# Patient Record
Sex: FEMALE | Race: WHITE | NOT HISPANIC OR LATINO | Employment: OTHER | URBAN - METROPOLITAN AREA
[De-identification: names, ages, dates, MRNs, and addresses within clinical notes are randomized per-mention and may not be internally consistent; named-entity substitution may affect disease eponyms.]

---

## 2023-10-13 PROBLEM — J32.9 CHRONIC RHINOSINUSITIS: Status: ACTIVE | Noted: 2023-10-13

## 2023-10-13 PROBLEM — R44.8 FACIAL PRESSURE: Status: ACTIVE | Noted: 2023-10-13

## 2023-10-13 PROBLEM — J34.3 HYPERTROPHY OF BOTH INFERIOR NASAL TURBINATES: Status: ACTIVE | Noted: 2023-10-13

## 2023-10-13 PROBLEM — J34.89 NASAL OBSTRUCTION: Status: ACTIVE | Noted: 2023-10-13

## 2023-10-13 PROBLEM — J34.2 NASAL SEPTAL DEVIATION: Status: ACTIVE | Noted: 2023-10-13

## 2023-10-13 RX ORDER — LEVALBUTEROL TARTRATE 45 UG/1
2 AEROSOL, METERED ORAL EVERY 4 HOURS PRN
COMMUNITY
End: 2023-10-16 | Stop reason: SDUPTHER

## 2023-10-13 RX ORDER — ESOMEPRAZOLE MAGNESIUM 40 MG/1
40 CAPSULE, DELAYED RELEASE ORAL
COMMUNITY

## 2023-10-13 RX ORDER — LEVOFLOXACIN 500 MG/1
500 TABLET, FILM COATED ORAL DAILY
COMMUNITY
End: 2023-10-16 | Stop reason: ALTCHOICE

## 2023-10-13 RX ORDER — AZELASTINE 1 MG/ML
1 SPRAY, METERED NASAL 2 TIMES DAILY
COMMUNITY

## 2023-10-13 RX ORDER — OXYBUTYNIN CHLORIDE 10 MG/1
10 TABLET, EXTENDED RELEASE ORAL
COMMUNITY

## 2023-10-13 RX ORDER — CETIRIZINE HYDROCHLORIDE 1 MG/ML
SOLUTION ORAL DAILY
COMMUNITY

## 2023-10-13 RX ORDER — DULOXETIN HYDROCHLORIDE 20 MG/1
20 CAPSULE, DELAYED RELEASE ORAL DAILY
COMMUNITY
End: 2023-10-16 | Stop reason: ALTCHOICE

## 2023-10-13 RX ORDER — FERROUS SULFATE, DRIED 160(50) MG
1 TABLET, EXTENDED RELEASE ORAL DAILY
COMMUNITY

## 2023-10-13 RX ORDER — LEVALBUTEROL TARTRATE 45 UG/1
1 AEROSOL, METERED ORAL EVERY 4 HOURS PRN
COMMUNITY

## 2023-10-13 RX ORDER — BUPROPION HYDROCHLORIDE 150 MG/1
150 TABLET, FILM COATED, EXTENDED RELEASE ORAL
COMMUNITY
End: 2023-10-16 | Stop reason: ALTCHOICE

## 2023-10-13 RX ORDER — HYDROXYZINE HYDROCHLORIDE 10 MG/5ML
20 SYRUP ORAL NIGHTLY
COMMUNITY

## 2023-10-16 ENCOUNTER — OFFICE VISIT (OUTPATIENT)
Dept: OTOLARYNGOLOGY | Facility: CLINIC | Age: 38
End: 2023-10-16
Payer: MEDICARE

## 2023-10-16 VITALS
BODY MASS INDEX: 25.76 KG/M2 | HEIGHT: 62 IN | HEART RATE: 79 BPM | TEMPERATURE: 97.8 F | WEIGHT: 140 LBS | SYSTOLIC BLOOD PRESSURE: 93 MMHG | RESPIRATION RATE: 16 BRPM | DIASTOLIC BLOOD PRESSURE: 61 MMHG

## 2023-10-16 DIAGNOSIS — R09.81 NASAL CONGESTION: ICD-10-CM

## 2023-10-16 DIAGNOSIS — J32.9 CHRONIC RHINOSINUSITIS: Primary | ICD-10-CM

## 2023-10-16 DIAGNOSIS — J34.89 NASAL DRAINAGE: ICD-10-CM

## 2023-10-16 DIAGNOSIS — J34.3 HYPERTROPHY OF BOTH INFERIOR NASAL TURBINATES: ICD-10-CM

## 2023-10-16 DIAGNOSIS — J34.2 NASAL SEPTAL DEVIATION: ICD-10-CM

## 2023-10-16 DIAGNOSIS — J34.89 NASAL OBSTRUCTION: ICD-10-CM

## 2023-10-16 DIAGNOSIS — R44.8 FACIAL PRESSURE: ICD-10-CM

## 2023-10-16 PROBLEM — R09.89 THROAT TIGHTNESS: Status: RESOLVED | Noted: 2019-05-17 | Resolved: 2023-10-16

## 2023-10-16 PROBLEM — R21 RASH: Status: RESOLVED | Noted: 2018-05-16 | Resolved: 2023-10-16

## 2023-10-16 PROBLEM — M79.2 NEUROPATHIC PAIN: Status: RESOLVED | Noted: 2019-04-30 | Resolved: 2023-10-16

## 2023-10-16 PROBLEM — Z91.018 ALLERGY TO FOOD: Status: RESOLVED | Noted: 2018-05-16 | Resolved: 2023-10-16

## 2023-10-16 PROBLEM — J45.20 MILD INTERMITTENT ASTHMA WITHOUT COMPLICATION (HHS-HCC): Status: RESOLVED | Noted: 2023-04-14 | Resolved: 2023-10-16

## 2023-10-16 PROBLEM — E03.9 HYPOTHYROIDISM: Status: RESOLVED | Noted: 2019-04-30 | Resolved: 2023-10-16

## 2023-10-16 PROBLEM — J45.909 ASTHMA (HHS-HCC): Status: RESOLVED | Noted: 2019-04-30 | Resolved: 2023-10-16

## 2023-10-16 PROBLEM — N30.90 RECURRENT CYSTITIS: Status: RESOLVED | Noted: 2022-12-13 | Resolved: 2023-10-16

## 2023-10-16 PROBLEM — G90.A POSTURAL ORTHOSTATIC TACHYCARDIA SYNDROME: Status: RESOLVED | Noted: 2019-04-30 | Resolved: 2023-10-16

## 2023-10-16 PROBLEM — T78.3XXA ANGIO-EDEMA: Status: RESOLVED | Noted: 2019-05-17 | Resolved: 2023-10-16

## 2023-10-16 PROBLEM — R55 NEUROGENIC SYNCOPE: Status: RESOLVED | Noted: 2019-04-13 | Resolved: 2023-10-16

## 2023-10-16 PROBLEM — I73.00 RAYNAUD'S PHENOMENON: Status: RESOLVED | Noted: 2019-04-30 | Resolved: 2023-10-16

## 2023-10-16 PROBLEM — H17.9 CORNEAL SCAR, LEFT EYE: Status: RESOLVED | Noted: 2017-07-10 | Resolved: 2023-10-16

## 2023-10-16 PROBLEM — R35.0 URINARY FREQUENCY: Status: RESOLVED | Noted: 2022-12-13 | Resolved: 2023-10-16

## 2023-10-16 PROBLEM — R00.1 SINUS BRADYCARDIA: Status: RESOLVED | Noted: 2019-04-30 | Resolved: 2023-10-16

## 2023-10-16 PROBLEM — H52.7 REFRACTIVE ERROR: Status: RESOLVED | Noted: 2017-07-10 | Resolved: 2023-10-16

## 2023-10-16 PROBLEM — R22.0 LIP SWELLING: Status: RESOLVED | Noted: 2018-05-16 | Resolved: 2023-10-16

## 2023-10-16 PROBLEM — R53.83 FATIGUE: Status: RESOLVED | Noted: 2019-04-13 | Resolved: 2023-10-16

## 2023-10-16 PROBLEM — L50.1 IDIOPATHIC URTICARIA: Status: RESOLVED | Noted: 2019-05-17 | Resolved: 2023-10-16

## 2023-10-16 PROCEDURE — 1036F TOBACCO NON-USER: CPT | Performed by: STUDENT IN AN ORGANIZED HEALTH CARE EDUCATION/TRAINING PROGRAM

## 2023-10-16 PROCEDURE — 99214 OFFICE O/P EST MOD 30 MIN: CPT | Performed by: STUDENT IN AN ORGANIZED HEALTH CARE EDUCATION/TRAINING PROGRAM

## 2023-10-16 RX ORDER — OMEPRAZOLE 20 MG/1
1 CAPSULE, DELAYED RELEASE ORAL DAILY
COMMUNITY
Start: 2017-07-06 | End: 2023-10-16 | Stop reason: ALTCHOICE

## 2023-10-16 RX ORDER — GUAIFENESIN AND PHENYLEPHRINE HCL 400; 10 MG/1; MG/1
1 TABLET ORAL
COMMUNITY

## 2023-10-16 RX ORDER — MOMETASONE FUROATE 50 UG/1
2 SPRAY, METERED NASAL
COMMUNITY
Start: 2022-10-17

## 2023-10-16 RX ORDER — FLUTICASONE PROPIONATE 50 MCG
2 SPRAY, SUSPENSION (ML) NASAL DAILY
COMMUNITY
Start: 2023-05-08

## 2023-10-16 RX ORDER — ONDANSETRON 4 MG/1
1 TABLET, ORALLY DISINTEGRATING ORAL EVERY 8 HOURS PRN
COMMUNITY
Start: 2017-07-06

## 2023-10-16 RX ORDER — PREDNISONE 10 MG/1
TABLET ORAL
Qty: 34 TABLET | Refills: 0 | Status: SHIPPED | OUTPATIENT
Start: 2023-10-16 | End: 2023-11-10 | Stop reason: ALTCHOICE

## 2023-10-16 RX ORDER — EPINEPHRINE 0.3 MG/.3ML
1 INJECTION INTRAMUSCULAR ONCE AS NEEDED
COMMUNITY

## 2023-10-16 RX ORDER — ALBUTEROL SULFATE 90 UG/1
2 AEROSOL, METERED RESPIRATORY (INHALATION) EVERY 4 HOURS PRN
COMMUNITY
Start: 2019-04-12

## 2023-10-16 RX ORDER — LEVONORGESTREL 52 MG/1
1 INTRAUTERINE DEVICE INTRAUTERINE ONCE
COMMUNITY

## 2023-10-16 RX ORDER — LEVOTHYROXINE SODIUM 75 UG/1
75 TABLET ORAL DAILY
COMMUNITY

## 2023-10-16 RX ORDER — FLUTICASONE PROPIONATE 44 UG/1
1 AEROSOL, METERED RESPIRATORY (INHALATION)
COMMUNITY
Start: 2022-10-17

## 2023-10-16 RX ORDER — ACETAMINOPHEN 500 MG
50 TABLET ORAL DAILY
COMMUNITY
Start: 2023-06-01

## 2023-10-16 RX ORDER — CEFDINIR 300 MG/1
300 CAPSULE ORAL DAILY
COMMUNITY
Start: 2023-03-25 | End: 2023-10-16 | Stop reason: ALTCHOICE

## 2023-10-16 RX ORDER — MIDODRINE HYDROCHLORIDE 10 MG/1
10 TABLET ORAL 3 TIMES DAILY
COMMUNITY

## 2023-10-16 RX ORDER — CARIPRAZINE 1.5 MG/1
1.5 CAPSULE, GELATIN COATED ORAL EVERY 24 HOURS
COMMUNITY
Start: 2023-08-17

## 2023-10-16 RX ORDER — FAMOTIDINE 20 MG/1
20 TABLET, FILM COATED ORAL 2 TIMES DAILY
COMMUNITY
Start: 2021-08-24 | End: 2023-10-16 | Stop reason: ALTCHOICE

## 2023-10-16 ASSESSMENT — ENCOUNTER SYMPTOMS
OCCASIONAL FEELINGS OF UNSTEADINESS: 0
LOSS OF SENSATION IN FEET: 0
DEPRESSION: 0

## 2023-10-16 NOTE — PROGRESS NOTES
HPI  10/16/23  The patient presents for follow-up, she is here to discuss her upcoming surgery and clarify questions.    8/16/23  The patient present for follow-up, reports no significant improvement of her sinonasal symptomatology including bilateral facial pressure along the maxillary and frontal regions, anterior posterior nasal drainage, nasal congestion/obstruction.  CT reviewed and discussed with the patient notable for bilateral maxillary, ethmoid, frontal and sphenoid edema.  Prominent right septal deviation.    6/28/23  The patient presents for follow-up, reports persistent sinonasal symptomatology including bilateral nasal congestion/obstruction, facial pressure along the maxillary regions bilaterally (R>L). Does endorse improvement of her posterior nasal drainage but not resolution.  Current therapy: Flonase, azelastine, saline irrigations    Recall   37-year-old female presenting for initial evaluation of multiple sinonasal complaints.  Main Symptoms: Daily sinonasal symptoms include bilateral nasal congestion/obstruction, anterior posterior nasal drainage, fluctuant facial pressure along the maxillary regions more noticeable on the right  Duration: Years   Laterality: Bilateral R>L  Has been evaluated by allergy and immunology and found to have environmental allergies. Denies immunotherapy.  Patient denies facial pain, ansomia, dental pain, immunotherapy.   No odynophagia/dysphagia/SOB/dyspnea/hoarseness/fevers/chills/weight loss/night sweats.    Current treatment:  Nasal Steroid: No (has tried Nasonex in the past)  Sinus Rinse: No  Antihistamine: Cetirizine  Prednisone: No  Other: None    Recent CT: None   Previous nasal/sinus surgery: None  Endorses nasal trauma/fracture ~8y ago.     History reviewed. No pertinent past medical history.    History reviewed. No pertinent surgical history.      Current Outpatient Medications on File Prior to Visit   Medication Sig Dispense Refill    azelastine (Astelin)  137 mcg (0.1 %) nasal spray Administer 1 spray into each nostril 2 times a day. Use in each nostril as directed      buPROPion (Zyban) 150 mg 12 hr tablet Take 1 tablet (150 mg) by mouth. Do not crush, chew, or split.      calcium carbonate-vitamin D3 500 mg-5 mcg (200 unit) tablet Take 1 tablet by mouth once daily.      cetirizine (ZyrTEC) 1 mg/mL syrup Take by mouth once daily.      DULoxetine (Cymbalta) 20 mg DR capsule Take 1 capsule (20 mg) by mouth once daily. Do not crush or chew.      EPINEPHrine 0.3 mg/0.3 mL injection syringe Inject 0.3 mL (0.3 mg) into the muscle if needed. Inject entire contents of 1 syringe 0.3 mg into the muscle once as needed for allergies      esomeprazole (NexIUM) 40 mg DR capsule Take 1 capsule (40 mg) by mouth. Do not open capsule.      hydrOXYzine (Atarax) 10 mg/5 mL syrup Take 10 mL (20 mg) by mouth once daily at bedtime.      inhalational spacing device inhaler Use as instructed      levalbuterol (Xopenex) 45 mcg/actuation inhaler Inhale 1 puff every 4 hours if needed for wheezing.      levalbuterol (Xopenex) 45 mcg/actuation inhaler Inhale 2 puffs every 4 hours if needed for wheezing. Into the lungs every four hours as needed for wheezing      levoFLOXacin (Levaquin) 500 mg tablet Take 1 tablet (500 mg) by mouth once daily.      oxybutynin XL (Ditropan-XL) 10 mg 24 hr tablet Take 1 tablet (10 mg) by mouth. Do not crush, chew, or split.      sod bicarb-sod chlor-neti pot packet with rinse device by sinus irrigation route. Rinse daily   use as directed      vortioxetine (Trintellix) 5 mg tablet tablet Take 1 tablet (5 mg) by mouth once daily. For depression       No current facility-administered medications on file prior to visit.        Review of Systems  A detailed 12 point ROS was performed and is negative except as noted in the intake form, HPI and/or Past Medical History     There were no vitals filed for this visit.     Physical Exam   CONSTITUTIONAL: Vitals -reviewed from  intake field, well developed, well nourished.  VOICE: Normal voice quality  RESPIRATION: Breathing comfortably, no stridor.  CV: No clubbing/cyanosis/edema in hands.  EYES: EOM Intact, sclera normal.  NEURO: Alert and oriented times 3, Cranial nerves V,VII intact and symmetric bilaterally.  HEAD AND FACE: Symmetric facial features, no masses or lesions, sinuses nontender to palpation.  SALIVARY GLANDS: Parotid and submandibular glands normal bilaterally.  EARS: Normal external ears, external auditory canals, and TMs to otoscopy  + NOSE: External nose midline, anterior rhinoscopy is normal with limited visualization to the anterior aspect of the interior turbinates. No lesions noted.  Bilateral inferior turbinate hypertrophy  Right septal deviation  ORAL CAVITY/OROPHARYNX/LIPS: Normal mucous membranes, normal floor of mouth/tongue/OP, no masses or lesions are noted.  PHARYNGEAL WALLS AND NASOPHARYNX: No masses noted. Mucosa appears clean and moist  NECK/LYMPH: No LAD, no thyroid masses. Trachea palpably midline  SKIN: Neck skin is without scar or injury  PSYCH: Alert and oriented with appropriate mood and affect      Provider Impressions    Assessment  Chronic rhinosinusitis  Nasal airway obstruction  Nasal septal deviation  Facial pressure  Nasal drainage  Bilateral inferior turbinate hypertrophy    Discussion:  The patient and I discussed their current nasal / sinus symptoms as well as several therapeutic options.   Reports no improvement of her sinonasal symptomatology following adequate medical therapy. Multiple treatment alternatives, risks and benefits discussed, she would like to proceed with surgery including bilateral functional endoscopic sinus surgery, septoplasty, bilateral inferior turbinate reduction/outfracture.  The patient and I discussed the aspects of the proposed surgery today. We discussed alternative therapies, which include doing nothing and continuing medical therapy. We discussed the fact  that surgery offers an opportunity to correct any anatomic deformities and to relieve a problem that has not been improved with medical therapy. The anticipated benefits from endoscopic surgery include but are not limited to: eradication of sinus disease, relief from pain and symptoms. The risks and complications associated with this surgery were discussed. These include, but were not limited to: anesthesia/medication complications (cva, mi, death), heart/lung/brain dysfunction, synechiae formation, poor cosmetic result possibly requiring reconstructive surgery, septal perforation possibly requiring reconstructive surgery, bleeding, infection, postoperative pain, failure to relieve pain and other preoperative symptoms, visual acuity changes (temporary or permanent), loss of smell/taste, csf leak, need for further surgeries in future. Each of these potential complications was discussed in detail. No guarantees were implied and the possibility of recurrence was discussed and understood. The nature of the procedure and the pre and postoperative processes were discussed as well.   The patient understands, asked appropriate questions, and wishes to proceed with surgery.   Preoperative steroid taper prescribed (risks/potential adverse effects discussed).

## 2023-11-02 PROCEDURE — 88305 TISSUE EXAM BY PATHOLOGIST: CPT

## 2023-11-02 PROCEDURE — 88305 TISSUE EXAM BY PATHOLOGIST: CPT | Performed by: PATHOLOGY

## 2023-11-03 ENCOUNTER — LAB REQUISITION (OUTPATIENT)
Dept: LAB | Facility: HOSPITAL | Age: 38
End: 2023-11-03
Payer: MEDICARE

## 2023-11-03 DIAGNOSIS — J31.0 CHRONIC RHINITIS: ICD-10-CM

## 2023-11-10 ENCOUNTER — OFFICE VISIT (OUTPATIENT)
Dept: OTOLARYNGOLOGY | Facility: CLINIC | Age: 38
End: 2023-11-10
Payer: MEDICARE

## 2023-11-10 VITALS
RESPIRATION RATE: 16 BRPM | HEART RATE: 71 BPM | DIASTOLIC BLOOD PRESSURE: 60 MMHG | HEIGHT: 62 IN | SYSTOLIC BLOOD PRESSURE: 95 MMHG | TEMPERATURE: 97.6 F | WEIGHT: 140 LBS | BODY MASS INDEX: 25.76 KG/M2

## 2023-11-10 DIAGNOSIS — R44.8 FACIAL PRESSURE: ICD-10-CM

## 2023-11-10 DIAGNOSIS — J32.9 CHRONIC RHINOSINUSITIS: Primary | ICD-10-CM

## 2023-11-10 DIAGNOSIS — J34.89 NASAL OBSTRUCTION: ICD-10-CM

## 2023-11-10 DIAGNOSIS — R09.81 NASAL CONGESTION: ICD-10-CM

## 2023-11-10 DIAGNOSIS — J34.3 HYPERTROPHY OF BOTH INFERIOR NASAL TURBINATES: ICD-10-CM

## 2023-11-10 DIAGNOSIS — J34.89 NASAL DRAINAGE: ICD-10-CM

## 2023-11-10 DIAGNOSIS — J34.2 NASAL SEPTAL DEVIATION: ICD-10-CM

## 2023-11-10 DIAGNOSIS — J34.89 NASAL CRUSTING: ICD-10-CM

## 2023-11-10 PROCEDURE — 99024 POSTOP FOLLOW-UP VISIT: CPT | Performed by: STUDENT IN AN ORGANIZED HEALTH CARE EDUCATION/TRAINING PROGRAM

## 2023-11-10 PROCEDURE — 31237 NSL/SINS NDSC SURG BX POLYPC: CPT | Performed by: STUDENT IN AN ORGANIZED HEALTH CARE EDUCATION/TRAINING PROGRAM

## 2023-11-10 PROCEDURE — 1036F TOBACCO NON-USER: CPT | Performed by: STUDENT IN AN ORGANIZED HEALTH CARE EDUCATION/TRAINING PROGRAM

## 2023-11-10 RX ORDER — DOXYCYCLINE 100 MG/1
100 CAPSULE ORAL 2 TIMES DAILY
COMMUNITY
Start: 2023-11-02 | End: 2023-11-27 | Stop reason: ALTCHOICE

## 2023-11-10 RX ORDER — OXYCODONE HYDROCHLORIDE 5 MG/1
5 TABLET ORAL EVERY 6 HOURS PRN
COMMUNITY
Start: 2023-11-02 | End: 2023-11-27 | Stop reason: ALTCHOICE

## 2023-11-10 RX ORDER — PROPRANOLOL HYDROCHLORIDE 10 MG/1
10 TABLET ORAL 2 TIMES DAILY
COMMUNITY

## 2023-11-10 NOTE — PROGRESS NOTES
The patient presents for follow up.   Most recent surgery: 11/02/23  1.  Bilateral nasal endoscopy with total ethmoidectomy including       sphenoidotomy with tissue removal  2.  Bilateral nasal endoscopy with frontal sinusotomy  3.  Bilateral maxillary endoscopy with tissue removal   4.  Endoscopic septoplasty  5.  Bilateral submucosal inferior turbinate reductions  6.  Extracranial CT image guidance    Current symptoms: Reports nasal congestion associated with those splints, otherwise no complaints.    Current treatment:  Antibiotics: Yes  Sinus Rinse: Yes  Steroids: Saline/ Mometasone rinses   Other: None      ROS - No fevers, chills. No cough, hemoptysis. No n/v       Past Medical History:   Diagnosis Date    Allergy to food 05/16/2018    Angio-edema 05/17/2019    Last Assessment & Plan: Formatting of this note might be different from the original. This could be chronic idiopathic urticaria with angioedema vs. idiopathic anaphylaxis vs. both. - Take Zyrtec 10 mg twice daily. - If having a severe reaction, go to the emergency room or [facility] , and have a tryptase level drawn in 1-3 hours after onset of symptoms. - Patient isabelle    Asthma 04/30/2019    Corneal scar, left eye 07/10/2017    Last Assessment & Plan: Formatting of this note might be different from the original. Trace stromal haze centrally and nasally Refracts very well No detectable visual significance Pt does not wear CLs    Fatigue 04/13/2019    Hypothyroidism 04/30/2019    Idiopathic urticaria 05/17/2019    Last Assessment & Plan: Formatting of this note might be different from the original. This could be chronic idiopathic urticaria with angioedema vs. idiopathic anaphylaxis vs. both. - Take Zyrtec 10 mg twice daily. - If having a severe reaction, go to the emergency room or [facility] , and have a tryptase level drawn in 1-3 hours after onset of symptoms. - Patient isabelle    Lip swelling  05/16/2018    Mild intermittent asthma without complication 04/14/2023    Neurogenic syncope 04/13/2019    Neuropathic pain 04/30/2019    Postural orthostatic tachycardia syndrome 04/30/2019    Dx age 19 dysautonomia Dr. Land/Sammy    Rash 05/16/2018    Raynaud's phenomenon 04/30/2019    Recurrent cystitis 12/13/2022    Refractive error 07/10/2017    Last Assessment & Plan: Formatting of this note might be different from the original. MRx given today    Sinus bradycardia 04/30/2019    Throat tightness 05/17/2019    Last Assessment & Plan: Formatting of this note might be different from the original. There is no evidence of sensitization to eggs and milk. I suspect her shortness of breath and the feeling like her throat is swelling closing may be due to inducible laryngeal obstruction. This can be brought on by different  things, including a history of personal trauma. - Consult placed to see Dr. Dahlia Cardenas    Urinary frequency 12/13/2022       History reviewed. No pertinent surgical history.      Current Outpatient Medications on File Prior to Visit   Medication Sig Dispense Refill    albuterol 90 mcg/actuation inhaler Inhale 2 puffs every 4 hours if needed for wheezing.      azelastine (Astelin) 137 mcg (0.1 %) nasal spray Administer 1 spray into each nostril 2 times a day. Use in each nostril as directed      calcium carbonate-vitamin D3 500 mg-5 mcg (200 unit) tablet Take 1 tablet by mouth once daily.      cetirizine (ZyrTEC) 1 mg/mL syrup Take by mouth once daily.      cholecalciferol (Vitamin D-3) 50 mcg (2,000 unit) capsule Take 1 capsule (50 mcg) by mouth once daily.      DOCOSAHEXAENOIC ACID ORAL Take 1,000 mg by mouth once daily.      doxycycline (Vibramycin) 100 mg capsule Take 1 capsule (100 mg) by mouth 2 times a day.      EPINEPHrine (EpiPen) 0.3 mg/0.3 mL injection syringe Inject 0.3 mL (0.3 mg) into the muscle 1 time if needed for anaphylaxis.      esomeprazole (NexIUM) 40 mg DR capsule Take 1  capsule (40 mg) by mouth. Do not open capsule.      Flovent HFA 44 mcg/actuation inhaler Inhale 1 puff 2 times a day.      fluticasone (Flonase) 50 mcg/actuation nasal spray Administer 2 sprays into each nostril once daily.      hydrOXYzine (Atarax) 10 mg/5 mL syrup Take 10 mL (20 mg) by mouth once daily at bedtime.      inhalational spacing device inhaler Use as instructed      levalbuterol (Xopenex) 45 mcg/actuation inhaler Inhale 1 puff every 4 hours if needed for wheezing.      levonorgestrel (Mirena) 21 mcg/24 hours (8 yrs) 52 mg IUD 52 mg by intrauterine route 1 time.      levothyroxine (Synthroid, Levoxyl) 75 mcg tablet Take 1 tablet (75 mcg) by mouth once daily.      midodrine (Proamatine) 10 mg tablet Take 1 tablet (10 mg) by mouth 3 times a day.      mometasone (Nasonex) 50 mcg/actuation nasal spray Administer 2 sprays into affected nostril(s) once daily.      ondansetron ODT (Zofran-ODT) 4 mg disintegrating tablet Take 1 tablet (4 mg) by mouth every 8 hours if needed for nausea.      oxybutynin XL (Ditropan-XL) 10 mg 24 hr tablet Take 1 tablet (10 mg) by mouth. Do not crush, chew, or split.      oxyCODONE (Roxicodone) 5 mg immediate release tablet Take 1 tablet (5 mg) by mouth every 6 hours if needed for moderate pain (4 - 6).      propranolol (Inderal) 10 mg tablet Take 1 tablet (10 mg) by mouth 2 times a day.      sod bicarb-sod chlor-neti pot packet with rinse device by sinus irrigation route. Rinse daily   use as directed      turmeric root extract 500 mg capsule Take 1 tablet by mouth.      vortioxetine (Trintellix) 5 mg tablet tablet Take 1 tablet (5 mg) by mouth once daily. For depression      Vraylar 1.5 mg capsule Take 1 capsule (1.5 mg) by mouth once every 24 hours.      [DISCONTINUED] predniSONE (Deltasone) 10 mg tablet Take 4 tablets daily for 4 days, then 3 tablets daily for 3 days, then 2 tablets daily for 3 days, then 1 tablet daily for 3 days, then stop. (Patient not taking: Reported on  11/10/2023) 34 tablet 0     No current facility-administered medications on file prior to visit.        Review of Systems  A detailed 12 point ROS was performed and is negative except as noted in the intake form, HPI and/or Past Medical History     Vitals:    11/10/23 1112   BP: 95/60   Pulse: 71   Resp: 16   Temp: 36.4 °C (97.6 °F)        Physical Exam   CONSTITUTIONAL: Vitals -reviewed from intake field, well developed, well nourished.  VOICE: Normal voice quality  RESPIRATION: Breathing comfortably, no stridor.  CV: No clubbing/cyanosis/edema in hands.  EYES: EOM Intact, sclera normal.  NEURO: Alert and oriented times 3, Cranial nerves V,VII intact and symmetric bilaterally.  HEAD AND FACE: Symmetric facial features, no masses or lesions, sinuses nontender to palpation.  SALIVARY GLANDS: Parotid and submandibular glands normal bilaterally.  EARS: Normal external ears, external auditory canals, and TMs to otoscopy  + NOSE: External nose midline, anterior rhinoscopy is normal with limited visualization to the anterior aspect of the interior turbinates. No lesions noted.  Bledsoe splints removed  ORAL CAVITY/OROPHARYNX/LIPS: Normal mucous membranes, normal floor of mouth/tongue/OP, no masses or lesions are noted.  PHARYNGEAL WALLS AND NASOPHARYNX: No masses noted. Mucosa appears clean and moist  NECK/LYMPH: No LAD, no thyroid masses. Trachea palpably midline  SKIN: Neck skin is without injury  PSYCH: Alert and oriented with appropriate mood and affect       Procedure: bilateral nasal endoscopy with debridement  Pre-op dx: Chronic rhinosinusitis  Post-op dx: same  Procedure in detail:   The risks, benefits, and alternatives were explained.  The patient wished to proceed and provided verbal consent.    After topical anesthetic was given and nasal endoscopy with debridement was performed bilaterally:    Sinus endoscopy:     Right:    Maxillary antrostomy patent  Ethmoid clear to roof  Sphenoid patent  Frontal recess  patent     Left:   Maxillary antrostomy patent  Ethmoid clear to roof  Sphenoid patent  Frontal recess patent     Notable findings: Nasal crusts removed removed bilaterally from middle meatus and posterior ethmoid region.  Widely patent nasal corridors, septum healing well.    Assessment  Chronic rhinosinusitis s/p ESS 11/02/23  Nasal airway obstruction, nasal septal deviation, Bilateral inferior turbinate hypertrophy status post septoplasty, inferior turbinate reductions.     Plan  The patient is healing well from surgery  Continue saline/mometasone rinses  RTC 2w

## 2023-11-15 LAB
LABORATORY COMMENT REPORT: NORMAL
PATH REPORT.FINAL DX SPEC: NORMAL
PATH REPORT.GROSS SPEC: NORMAL
PATH REPORT.RELEVANT HX SPEC: NORMAL
PATH REPORT.TOTAL CANCER: NORMAL

## 2023-11-27 ENCOUNTER — OFFICE VISIT (OUTPATIENT)
Dept: OTOLARYNGOLOGY | Facility: CLINIC | Age: 38
End: 2023-11-27
Payer: MEDICARE

## 2023-11-27 VITALS
TEMPERATURE: 97.8 F | WEIGHT: 134 LBS | HEIGHT: 62 IN | RESPIRATION RATE: 16 BRPM | SYSTOLIC BLOOD PRESSURE: 92 MMHG | DIASTOLIC BLOOD PRESSURE: 60 MMHG | BODY MASS INDEX: 24.66 KG/M2 | HEART RATE: 67 BPM

## 2023-11-27 DIAGNOSIS — J34.3 HYPERTROPHY OF BOTH INFERIOR NASAL TURBINATES: ICD-10-CM

## 2023-11-27 DIAGNOSIS — J34.89 NASAL CRUSTING: ICD-10-CM

## 2023-11-27 DIAGNOSIS — J34.89 NASAL DRAINAGE: ICD-10-CM

## 2023-11-27 DIAGNOSIS — J32.9 CHRONIC RHINOSINUSITIS: Primary | ICD-10-CM

## 2023-11-27 DIAGNOSIS — J34.2 NASAL SEPTAL DEVIATION: ICD-10-CM

## 2023-11-27 DIAGNOSIS — R44.8 FACIAL PRESSURE: ICD-10-CM

## 2023-11-27 DIAGNOSIS — R09.81 NASAL CONGESTION: ICD-10-CM

## 2023-11-27 DIAGNOSIS — J34.89 NASAL OBSTRUCTION: ICD-10-CM

## 2023-11-27 PROCEDURE — 1036F TOBACCO NON-USER: CPT | Performed by: STUDENT IN AN ORGANIZED HEALTH CARE EDUCATION/TRAINING PROGRAM

## 2023-11-27 PROCEDURE — 99024 POSTOP FOLLOW-UP VISIT: CPT | Performed by: STUDENT IN AN ORGANIZED HEALTH CARE EDUCATION/TRAINING PROGRAM

## 2023-11-27 PROCEDURE — 31237 NSL/SINS NDSC SURG BX POLYPC: CPT | Performed by: STUDENT IN AN ORGANIZED HEALTH CARE EDUCATION/TRAINING PROGRAM

## 2023-11-27 NOTE — PROGRESS NOTES
The patient presents for follow up.   Most recent surgery: 11/02/23  1.  Bilateral nasal endoscopy with total ethmoidectomy including       sphenoidotomy with tissue removal  2.  Bilateral nasal endoscopy with frontal sinusotomy  3.  Bilateral maxillary endoscopy with tissue removal   4.  Endoscopic septoplasty  5.  Bilateral submucosal inferior turbinate reductions  6.  Extracranial CT image guidance    Current symptoms: The patient present for follow-up, states she has been doing well.  Reports significant improvement of her nasal breathing.  Pathology reviewed and discussed with the patient, consistent with chronic sinusitis.      Current treatment:  Antibiotics: No  Sinus Rinse: Yes  Steroids: Saline/ Mometasone rinses   Other: None      ROS - No fevers, chills. No cough, hemoptysis. No n/v       Past Medical History:   Diagnosis Date    Allergy to food 05/16/2018    Angio-edema 05/17/2019    Last Assessment & Plan: Formatting of this note might be different from the original. This could be chronic idiopathic urticaria with angioedema vs. idiopathic anaphylaxis vs. both. - Take Zyrtec 10 mg twice daily. - If having a severe reaction, go to the emergency room or [facility] , and have a tryptase level drawn in 1-3 hours after onset of symptoms. - Patient isabelle    Asthma 04/30/2019    Corneal scar, left eye 07/10/2017    Last Assessment & Plan: Formatting of this note might be different from the original. Trace stromal haze centrally and nasally Refracts very well No detectable visual significance Pt does not wear CLs    Fatigue 04/13/2019    Hypothyroidism 04/30/2019    Idiopathic urticaria 05/17/2019    Last Assessment & Plan: Formatting of this note might be different from the original. This could be chronic idiopathic urticaria with angioedema vs. idiopathic anaphylaxis vs. both. - Take Zyrtec 10 mg twice daily. - If having a severe reaction, go to the emergency room or [facility] facility/medical center , and have a tryptase level drawn in 1-3 hours after onset of symptoms. - Patient isabelle    Lip swelling 05/16/2018    Mild intermittent asthma without complication 04/14/2023    Neurogenic syncope 04/13/2019    Neuropathic pain 04/30/2019    Postural orthostatic tachycardia syndrome 04/30/2019    Dx age 19 dysautonomia Dr. Land/Sammy    Rash 05/16/2018    Raynaud's phenomenon 04/30/2019    Recurrent cystitis 12/13/2022    Refractive error 07/10/2017    Last Assessment & Plan: Formatting of this note might be different from the original. MRx given today    Sinus bradycardia 04/30/2019    Throat tightness 05/17/2019    Last Assessment & Plan: Formatting of this note might be different from the original. There is no evidence of sensitization to eggs and milk. I suspect her shortness of breath and the feeling like her throat is swelling closing may be due to inducible laryngeal obstruction. This can be brought on by different  things, including a history of personal trauma. - Consult placed to see Dr. Dahlia Cardenas    Urinary frequency 12/13/2022       History reviewed. No pertinent surgical history.      Current Outpatient Medications on File Prior to Visit   Medication Sig Dispense Refill    albuterol 90 mcg/actuation inhaler Inhale 2 puffs every 4 hours if needed for wheezing.      azelastine (Astelin) 137 mcg (0.1 %) nasal spray Administer 1 spray into each nostril 2 times a day. Use in each nostril as directed      calcium carbonate-vitamin D3 500 mg-5 mcg (200 unit) tablet Take 1 tablet by mouth once daily.      cetirizine (ZyrTEC) 1 mg/mL syrup Take by mouth once daily.      cholecalciferol (Vitamin D-3) 50 mcg (2,000 unit) capsule Take 1 capsule (50 mcg) by mouth once daily.      DOCOSAHEXAENOIC ACID ORAL Take 1,000 mg by mouth once daily.      EPINEPHrine (EpiPen) 0.3 mg/0.3 mL injection syringe Inject 0.3 mL (0.3 mg) into the muscle 1 time if needed for anaphylaxis.       esomeprazole (NexIUM) 40 mg DR capsule Take 1 capsule (40 mg) by mouth. Do not open capsule.      Flovent HFA 44 mcg/actuation inhaler Inhale 1 puff 2 times a day.      fluticasone (Flonase) 50 mcg/actuation nasal spray Administer 2 sprays into each nostril once daily.      hydrOXYzine (Atarax) 10 mg/5 mL syrup Take 10 mL (20 mg) by mouth once daily at bedtime.      inhalational spacing device inhaler Use as instructed      levalbuterol (Xopenex) 45 mcg/actuation inhaler Inhale 1 puff every 4 hours if needed for wheezing.      levonorgestrel (Mirena) 21 mcg/24 hours (8 yrs) 52 mg IUD 52 mg by intrauterine route 1 time.      levothyroxine (Synthroid, Levoxyl) 75 mcg tablet Take 1 tablet (75 mcg) by mouth once daily.      midodrine (Proamatine) 10 mg tablet Take 1 tablet (10 mg) by mouth 3 times a day.      mometasone (Nasonex) 50 mcg/actuation nasal spray Administer 2 sprays into affected nostril(s) once daily.      ondansetron ODT (Zofran-ODT) 4 mg disintegrating tablet Take 1 tablet (4 mg) by mouth every 8 hours if needed for nausea.      oxybutynin XL (Ditropan-XL) 10 mg 24 hr tablet Take 1 tablet (10 mg) by mouth. Do not crush, chew, or split.      propranolol (Inderal) 10 mg tablet Take 1 tablet (10 mg) by mouth 2 times a day.      sod bicarb-sod chlor-neti pot packet with rinse device by sinus irrigation route. Rinse daily   use as directed      turmeric root extract 500 mg capsule Take 1 tablet by mouth.      vortioxetine (Trintellix) 5 mg tablet tablet Take 1 tablet (5 mg) by mouth once daily. For depression      Vraylar 1.5 mg capsule Take 1 capsule (1.5 mg) by mouth once every 24 hours.      [DISCONTINUED] doxycycline (Vibramycin) 100 mg capsule Take 1 capsule (100 mg) by mouth 2 times a day.      [DISCONTINUED] oxyCODONE (Roxicodone) 5 mg immediate release tablet Take 1 tablet (5 mg) by mouth every 6 hours if needed for moderate pain (4 - 6).       No current facility-administered medications on file prior  to visit.        Review of Systems  A detailed 12 point ROS was performed and is negative except as noted in the intake form, HPI and/or Past Medical History     Vitals:    11/27/23 1315   BP: 92/60   Pulse: 67   Resp: 16   Temp: 36.6 °C (97.8 °F)        Physical Exam   CONSTITUTIONAL: Vitals -reviewed from intake field, well developed, well nourished.  VOICE: Normal voice quality  RESPIRATION: Breathing comfortably, no stridor.  CV: No clubbing/cyanosis/edema in hands.  EYES: EOM Intact, sclera normal.  NEURO: Alert and oriented times 3, Cranial nerves V,VII intact and symmetric bilaterally.  HEAD AND FACE: Symmetric facial features, no masses or lesions, sinuses nontender to palpation.  SALIVARY GLANDS: Parotid and submandibular glands normal bilaterally.  EARS: Normal external ears, external auditory canals, and TMs to otoscopy  NOSE: External nose midline, anterior rhinoscopy is normal with limited visualization to the anterior aspect of the interior turbinates. No lesions noted.  ORAL CAVITY/OROPHARYNX/LIPS: Normal mucous membranes, normal floor of mouth/tongue/OP, no masses or lesions are noted.  PHARYNGEAL WALLS AND NASOPHARYNX: No masses noted. Mucosa appears clean and moist  NECK/LYMPH: No LAD, no thyroid masses. Trachea palpably midline  SKIN: Neck skin is without injury  PSYCH: Alert and oriented with appropriate mood and affect       Procedure: bilateral nasal endoscopy with debridement  Pre-op dx: Chronic rhinosinusitis  Post-op dx: same  Procedure in detail:   The risks, benefits, and alternatives were explained.  The patient wished to proceed and provided verbal consent.    After topical anesthetic was given and nasal endoscopy with debridement was performed bilaterally:    Sinus endoscopy:     Right:    Maxillary antrostomy patent  Ethmoid clear to roof  Sphenoid patent  Frontal recess patent     Left:   Maxillary antrostomy patent  Ethmoid clear to roof  Sphenoid patent  Frontal recess patent      Notable findings: Mild bilateral generalized mucosal edema. Nasal crusts removed removed bilaterally from middle meatus and posterior ethmoid region.    Widely patent nasal corridors, septum healing well.    Assessment  Chronic rhinosinusitis s/p ESS 11/02/23  Nasal airway obstruction, nasal septal deviation, Bilateral inferior turbinate hypertrophy status post septoplasty, inferior turbinate reductions.     Plan  The patient is healing well from surgery  Continue saline/mometasone rinses  RTC 2m

## 2023-12-11 ENCOUNTER — TELEPHONE (OUTPATIENT)
Dept: OTOLARYNGOLOGY | Facility: CLINIC | Age: 38
End: 2023-12-11
Payer: MEDICARE

## 2024-02-12 ENCOUNTER — OFFICE VISIT (OUTPATIENT)
Dept: OTOLARYNGOLOGY | Facility: CLINIC | Age: 39
End: 2024-02-12
Payer: MEDICARE

## 2024-02-12 VITALS — HEIGHT: 62 IN | WEIGHT: 134 LBS | BODY MASS INDEX: 24.66 KG/M2

## 2024-02-12 DIAGNOSIS — J34.89 NASAL DRAINAGE: ICD-10-CM

## 2024-02-12 DIAGNOSIS — J34.89 NASAL CRUSTING: ICD-10-CM

## 2024-02-12 DIAGNOSIS — R44.8 FACIAL PRESSURE: ICD-10-CM

## 2024-02-12 DIAGNOSIS — J34.2 NASAL SEPTAL DEVIATION: ICD-10-CM

## 2024-02-12 DIAGNOSIS — J34.89 NASAL OBSTRUCTION: ICD-10-CM

## 2024-02-12 DIAGNOSIS — J34.3 HYPERTROPHY OF BOTH INFERIOR NASAL TURBINATES: ICD-10-CM

## 2024-02-12 DIAGNOSIS — J32.9 CHRONIC RHINOSINUSITIS: Primary | ICD-10-CM

## 2024-02-12 DIAGNOSIS — R09.81 NASAL CONGESTION: ICD-10-CM

## 2024-02-12 PROCEDURE — 99213 OFFICE O/P EST LOW 20 MIN: CPT | Performed by: STUDENT IN AN ORGANIZED HEALTH CARE EDUCATION/TRAINING PROGRAM

## 2024-02-12 PROCEDURE — 31231 NASAL ENDOSCOPY DX: CPT | Performed by: STUDENT IN AN ORGANIZED HEALTH CARE EDUCATION/TRAINING PROGRAM

## 2024-02-12 PROCEDURE — 1036F TOBACCO NON-USER: CPT | Performed by: STUDENT IN AN ORGANIZED HEALTH CARE EDUCATION/TRAINING PROGRAM

## 2024-02-12 RX ORDER — SOD CHLOR,BICARB/SQUEEZ BOTTLE
PACKET, WITH RINSE DEVICE NASAL
Qty: 1 EACH | Refills: 3 | Status: SHIPPED | OUTPATIENT
Start: 2024-02-12

## 2024-02-12 RX ORDER — CEPHALEXIN 500 MG/1
500 TABLET ORAL 2 TIMES DAILY
COMMUNITY
Start: 2024-02-09 | End: 2024-02-14

## 2024-02-12 RX ORDER — ARIPIPRAZOLE 2 MG/1
2 TABLET ORAL DAILY
COMMUNITY

## 2024-02-12 RX ORDER — BUSPIRONE HYDROCHLORIDE 7.5 MG/1
7.5 TABLET ORAL 2 TIMES DAILY
COMMUNITY
Start: 2023-12-20

## 2024-02-12 NOTE — PROGRESS NOTES
The patient presents for follow up.   Most recent surgery: 11/02/23  1.  Bilateral nasal endoscopy with total ethmoidectomy including       sphenoidotomy with tissue removal  2.  Bilateral nasal endoscopy with frontal sinusotomy  3.  Bilateral maxillary endoscopy with tissue removal   4.  Endoscopic septoplasty  5.  Bilateral submucosal inferior turbinate reductions  6.  Extracranial CT image guidance    Current symptoms: The patient present for follow-up, states she has been doing well.  No sinonasal complaints.     Current treatment:  Antibiotics: No  Sinus Rinse: Yes  Steroids: Saline/ Mometasone rinses   Other: None      ROS - No fevers, chills. No cough, hemoptysis. No n/v       Past Medical History:   Diagnosis Date    Allergy to food 05/16/2018    Angio-edema 05/17/2019    Last Assessment & Plan: Formatting of this note might be different from the original. This could be chronic idiopathic urticaria with angioedema vs. idiopathic anaphylaxis vs. both. - Take Zyrtec 10 mg twice daily. - If having a severe reaction, go to the emergency room or [facility] , and have a tryptase level drawn in 1-3 hours after onset of symptoms. - Patient isabelle    Asthma 04/30/2019    Corneal scar, left eye 07/10/2017    Last Assessment & Plan: Formatting of this note might be different from the original. Trace stromal haze centrally and nasally Refracts very well No detectable visual significance Pt does not wear CLs    Fatigue 04/13/2019    Hypothyroidism 04/30/2019    Idiopathic urticaria 05/17/2019    Last Assessment & Plan: Formatting of this note might be different from the original. This could be chronic idiopathic urticaria with angioedema vs. idiopathic anaphylaxis vs. both. - Take Zyrtec 10 mg twice daily. - If having a severe reaction, go to the emergency room or [facility] , and have a tryptase level drawn in 1-3 hours after onset of symptoms. - Patient isabelle     Lip swelling 05/16/2018    Mild intermittent asthma without complication 04/14/2023    Neurogenic syncope 04/13/2019    Neuropathic pain 04/30/2019    Postural orthostatic tachycardia syndrome 04/30/2019    Dx age 19 dysautonomia Dr. Land/Sammy    Rash 05/16/2018    Raynaud's phenomenon 04/30/2019    Recurrent cystitis 12/13/2022    Refractive error 07/10/2017    Last Assessment & Plan: Formatting of this note might be different from the original. MRx given today    Sinus bradycardia 04/30/2019    Throat tightness 05/17/2019    Last Assessment & Plan: Formatting of this note might be different from the original. There is no evidence of sensitization to eggs and milk. I suspect her shortness of breath and the feeling like her throat is swelling closing may be due to inducible laryngeal obstruction. This can be brought on by different  things, including a history of personal trauma. - Consult placed to see Dr. Dahlia Cardenas    Urinary frequency 12/13/2022       No past surgical history on file.      Current Outpatient Medications on File Prior to Visit   Medication Sig Dispense Refill    albuterol 90 mcg/actuation inhaler Inhale 2 puffs every 4 hours if needed for wheezing.      azelastine (Astelin) 137 mcg (0.1 %) nasal spray Administer 1 spray into each nostril 2 times a day. Use in each nostril as directed      calcium carbonate-vitamin D3 500 mg-5 mcg (200 unit) tablet Take 1 tablet by mouth once daily.      cetirizine (ZyrTEC) 1 mg/mL syrup Take by mouth once daily.      cholecalciferol (Vitamin D-3) 50 mcg (2,000 unit) capsule Take 1 capsule (50 mcg) by mouth once daily.      DOCOSAHEXAENOIC ACID ORAL Take 1,000 mg by mouth once daily.      EPINEPHrine (EpiPen) 0.3 mg/0.3 mL injection syringe Inject 0.3 mL (0.3 mg) into the muscle 1 time if needed for anaphylaxis.      esomeprazole (NexIUM) 40 mg DR capsule Take 1 capsule (40 mg) by mouth. Do not open capsule.      Flovent HFA 44 mcg/actuation inhaler Inhale  1 puff 2 times a day.      fluticasone (Flonase) 50 mcg/actuation nasal spray Administer 2 sprays into each nostril once daily.      hydrOXYzine (Atarax) 10 mg/5 mL syrup Take 10 mL (20 mg) by mouth once daily at bedtime.      inhalational spacing device inhaler Use as instructed      levalbuterol (Xopenex) 45 mcg/actuation inhaler Inhale 1 puff every 4 hours if needed for wheezing.      levonorgestrel (Mirena) 21 mcg/24 hours (8 yrs) 52 mg IUD 52 mg by intrauterine route 1 time.      levothyroxine (Synthroid, Levoxyl) 75 mcg tablet Take 1 tablet (75 mcg) by mouth once daily.      midodrine (Proamatine) 10 mg tablet Take 1 tablet (10 mg) by mouth 3 times a day.      mometasone (Nasonex) 50 mcg/actuation nasal spray Administer 2 sprays into affected nostril(s) once daily.      ondansetron ODT (Zofran-ODT) 4 mg disintegrating tablet Take 1 tablet (4 mg) by mouth every 8 hours if needed for nausea.      oxybutynin XL (Ditropan-XL) 10 mg 24 hr tablet Take 1 tablet (10 mg) by mouth. Do not crush, chew, or split.      propranolol (Inderal) 10 mg tablet Take 1 tablet (10 mg) by mouth 2 times a day.      sod bicarb-sod chlor-neti pot packet with rinse device by sinus irrigation route. Rinse daily   use as directed      turmeric root extract 500 mg capsule Take 1 tablet by mouth.      vortioxetine (Trintellix) 5 mg tablet tablet Take 1 tablet (5 mg) by mouth once daily. For depression      Vraylar 1.5 mg capsule Take 1 capsule (1.5 mg) by mouth once every 24 hours.       No current facility-administered medications on file prior to visit.        Review of Systems  A detailed 12 point ROS was performed and is negative except as noted in the intake form, HPI and/or Past Medical History     There were no vitals filed for this visit.       Physical Exam   CONSTITUTIONAL: Vitals -reviewed from intake field, well developed, well nourished.  VOICE: Normal voice quality  RESPIRATION: Breathing comfortably, no stridor.  CV: No  clubbing/cyanosis/edema in hands.  EYES: EOM Intact, sclera normal.  NEURO: Alert and oriented times 3, Cranial nerves V,VII intact and symmetric bilaterally.  HEAD AND FACE: Symmetric facial features, no masses or lesions, sinuses nontender to palpation.  SALIVARY GLANDS: Parotid and submandibular glands normal bilaterally.  EARS: Normal external ears, external auditory canals, and TMs to otoscopy  NOSE: External nose midline, anterior rhinoscopy is normal with limited visualization to the anterior aspect of the interior turbinates. No lesions noted.  ORAL CAVITY/OROPHARYNX/LIPS: Normal mucous membranes, normal floor of mouth/tongue/OP, no masses or lesions are noted.  PHARYNGEAL WALLS AND NASOPHARYNX: No masses noted. Mucosa appears clean and moist  NECK/LYMPH: No LAD, no thyroid masses. Trachea palpably midline  SKIN: Neck skin is without injury  PSYCH: Alert and oriented with appropriate mood and affect       Nasal endoscopy:  PROCEDURE NOTE    For better visualization because of septal deviation, turbinate hypertrophy nasal endoscopy was performed after verbal consent was obtained by the patient and/or guardian. Both nostrils were sprayed with a mixture of lidocaine 4% and Afrin. After a sufficient amount of time elapsed for mucosal anesthesia to take place, the nasal endoscope was advanced into the nostril.    The following areas were visualized:  Nasal passage, nasal septum, turbinates, middle meatus, nasopharynx, sinus ostia    The patient tolerated the procedure well and these structures were found to be normal except as follows:  Right:    Maxillary antrostomy patent  Ethmoid clear to roof  Sphenoid patent  Frontal recess patent     Left:   Maxillary antrostomy patent  Ethmoid clear to roof  Sphenoid patent  Frontal recess patent     Notable findings: Widely patent nasal corridors no mucosal edema.  Septum healing well.     No mucopurulence, polyps, nor masses seen in inferior meati, middle meati, nor  sphenoethmoidal recesses bilaterally.       Assessment  Chronic rhinosinusitis s/p ESS 11/02/23  Nasal airway obstruction, nasal septal deviation, Bilateral inferior turbinate hypertrophy status post septoplasty, inferior turbinate reductions.     Plan  The patient is healing well from surgery  Continue saline/mometasone rinses qd  RTC 4m

## 2024-06-17 ENCOUNTER — APPOINTMENT (OUTPATIENT)
Dept: OTOLARYNGOLOGY | Facility: CLINIC | Age: 39
End: 2024-06-17
Payer: MEDICARE

## 2024-06-17 VITALS — WEIGHT: 141.2 LBS | BODY MASS INDEX: 25.98 KG/M2 | HEIGHT: 62 IN

## 2024-06-17 DIAGNOSIS — J32.9 CHRONIC RHINOSINUSITIS: Primary | ICD-10-CM

## 2024-06-17 DIAGNOSIS — J34.2 NASAL SEPTAL DEVIATION: ICD-10-CM

## 2024-06-17 DIAGNOSIS — J34.89 NASAL DRAINAGE: ICD-10-CM

## 2024-06-17 DIAGNOSIS — J34.89 NASAL OBSTRUCTION: ICD-10-CM

## 2024-06-17 DIAGNOSIS — R44.8 FACIAL PRESSURE: ICD-10-CM

## 2024-06-17 DIAGNOSIS — J34.3 HYPERTROPHY OF BOTH INFERIOR NASAL TURBINATES: ICD-10-CM

## 2024-06-17 DIAGNOSIS — R09.81 NASAL CONGESTION: ICD-10-CM

## 2024-06-17 PROCEDURE — 31231 NASAL ENDOSCOPY DX: CPT | Performed by: STUDENT IN AN ORGANIZED HEALTH CARE EDUCATION/TRAINING PROGRAM

## 2024-06-17 PROCEDURE — 1036F TOBACCO NON-USER: CPT | Performed by: STUDENT IN AN ORGANIZED HEALTH CARE EDUCATION/TRAINING PROGRAM

## 2024-06-17 PROCEDURE — 99213 OFFICE O/P EST LOW 20 MIN: CPT | Performed by: STUDENT IN AN ORGANIZED HEALTH CARE EDUCATION/TRAINING PROGRAM

## 2024-06-17 RX ORDER — FERROUS SULFATE TAB 325 MG (65 MG ELEMENTAL FE) 325 (65 FE) MG
325 TAB ORAL
COMMUNITY
Start: 2024-06-12

## 2024-06-17 NOTE — PROGRESS NOTES
The patient presents for follow up.   Most recent surgery: 11/02/23  1.  Bilateral nasal endoscopy with total ethmoidectomy including       sphenoidotomy with tissue removal  2.  Bilateral nasal endoscopy with frontal sinusotomy  3.  Bilateral maxillary endoscopy with tissue removal   4.  Endoscopic septoplasty  5.  Bilateral submucosal inferior turbinate reductions  6.  Extracranial CT image guidance    Current symptoms: The patient present for follow-up, no sinonasal complaints.    Current treatment:  Antibiotics: No  Sinus Rinse: Yes  Steroids: Saline/ Mometasone rinses   Other: None      ROS - No fevers, chills. No cough, hemoptysis. No n/v       Past Medical History:   Diagnosis Date    Allergy to food 05/16/2018    Angio-edema 05/17/2019    Last Assessment & Plan: Formatting of this note might be different from the original. This could be chronic idiopathic urticaria with angioedema vs. idiopathic anaphylaxis vs. both. - Take Zyrtec 10 mg twice daily. - If having a severe reaction, go to the emergency room or [facility] , and have a tryptase level drawn in 1-3 hours after onset of symptoms. - Patient isabelle    Asthma ([facility]) 04/30/2019    Corneal scar, left eye 07/10/2017    Last Assessment & Plan: Formatting of this note might be different from the original. Trace stromal haze centrally and nasally Refracts very well No detectable visual significance Pt does not wear CLs    Fatigue 04/13/2019    Hypothyroidism 04/30/2019    Idiopathic urticaria 05/17/2019    Last Assessment & Plan: Formatting of this note might be different from the original. This could be chronic idiopathic urticaria with angioedema vs. idiopathic anaphylaxis vs. both. - Take Zyrtec 10 mg twice daily. - If having a severe reaction, go to the emergency room or [facility] , and have a tryptase level drawn in 1-3 hours after onset of symptoms. - Patient isabelle    Lip swelling 05/16/2018     Mild intermittent asthma without complication ([facility]) 04/14/2023    Neurogenic syncope 04/13/2019    Neuropathic pain 04/30/2019    Postural orthostatic tachycardia syndrome 04/30/2019    Dx age 19 dysautonomia Dr. Land/Sammy    Rash 05/16/2018    Raynaud's phenomenon 04/30/2019    Recurrent cystitis 12/13/2022    Refractive error 07/10/2017    Last Assessment & Plan: Formatting of this note might be different from the original. MRx given today    Sinus bradycardia 04/30/2019    Throat tightness 05/17/2019    Last Assessment & Plan: Formatting of this note might be different from the original. There is no evidence of sensitization to eggs and milk. I suspect her shortness of breath and the feeling like her throat is swelling closing may be due to inducible laryngeal obstruction. This can be brought on by different  things, including a history of personal trauma. - Consult placed to see Dr. Dahlia Cardenas    Urinary frequency 12/13/2022       History reviewed. No pertinent surgical history.      Current Outpatient Medications on File Prior to Visit   Medication Sig Dispense Refill    FeroSuL tablet Take 1 tablet by mouth early in the morning..      albuterol 90 mcg/actuation inhaler Inhale 2 puffs every 4 hours if needed for wheezing.      ARIPiprazole (Abilify) 2 mg tablet Take 1 tablet (2 mg) by mouth once daily.      azelastine (Astelin) 137 mcg (0.1 %) nasal spray Administer 1 spray into each nostril 2 times a day. Use in each nostril as directed      busPIRone (Buspar) 7.5 mg tablet Take 1 tablet (7.5 mg) by mouth 2 times a day.      calcium carbonate-vitamin D3 500 mg-5 mcg (200 unit) tablet Take 1 tablet by mouth once daily.      cetirizine (ZyrTEC) 1 mg/mL syrup Take by mouth once daily.      cholecalciferol (Vitamin D-3) 50 mcg (2,000 unit) capsule Take 1 capsule (50 mcg) by mouth once daily.      DOCOSAHEXAENOIC ACID ORAL Take 1,000 mg by mouth once daily.      EPINEPHrine (EpiPen) 0.3 mg/0.3 mL  injection syringe Inject 0.3 mL (0.3 mg) into the muscle 1 time if needed for anaphylaxis.      esomeprazole (NexIUM) 40 mg DR capsule Take 1 capsule (40 mg) by mouth. Do not open capsule.      Flovent HFA 44 mcg/actuation inhaler Inhale 1 puff 2 times a day.      fluticasone (Flonase) 50 mcg/actuation nasal spray Administer 2 sprays into each nostril once daily.      hydrOXYzine (Atarax) 10 mg/5 mL syrup Take 10 mL (20 mg) by mouth once daily at bedtime.      inhalational spacing device inhaler Use as instructed      levalbuterol (Xopenex) 45 mcg/actuation inhaler Inhale 1 puff every 4 hours if needed for wheezing.      levonorgestrel (Mirena) 21 mcg/24 hours (8 yrs) 52 mg IUD 52 mg by intrauterine route 1 time.      levothyroxine (Synthroid, Levoxyl) 75 mcg tablet Take 1 tablet (75 mcg) by mouth once daily.      midodrine (Proamatine) 10 mg tablet Take 1 tablet (10 mg) by mouth 3 times a day.      mometasone (Nasonex) 50 mcg/actuation nasal spray Administer 2 sprays into affected nostril(s) once daily.      ondansetron ODT (Zofran-ODT) 4 mg disintegrating tablet Take 1 tablet (4 mg) by mouth every 8 hours if needed for nausea.      oxybutynin XL (Ditropan-XL) 10 mg 24 hr tablet Take 1 tablet (10 mg) by mouth. Do not crush, chew, or split.      propranolol (Inderal) 10 mg tablet Take 1 tablet (10 mg) by mouth 2 times a day.      sod bicarb-sod chlor-neti pot packet with rinse device by sinus irrigation route. Rinse daily   use as directed      sod chloride-sodium bicarb (Neilmed Sinus Rinse Complete) nasal rinse Irrigate your nose per instructions twice daily 1 each 3    turmeric root extract 500 mg capsule Take 1 tablet by mouth.      vortioxetine (Trintellix) 5 mg tablet tablet Take 1 tablet (5 mg) by mouth once daily. For depression      Vraylar 1.5 mg capsule Take 1 capsule (1.5 mg) by mouth once every 24 hours.       No current facility-administered medications on file prior to visit.        Review of  Systems  A detailed 12 point ROS was performed and is negative except as noted in the intake form, HPI and/or Past Medical History     There were no vitals filed for this visit.       Physical Exam   CONSTITUTIONAL: Well developed, well nourished.  VOICE: Normal voice quality  RESPIRATION: Breathing comfortably, no stridor.  CV: No clubbing/cyanosis/edema in hands.  EYES: EOM Intact, sclera normal.  NEURO: Alert and oriented times 3, Cranial nerves V,VII intact and symmetric bilaterally.  HEAD AND FACE: Symmetric facial features, no masses or lesions, sinuses nontender to palpation.  SALIVARY GLANDS: Parotid and submandibular glands normal bilaterally.  EARS: Normal external ears, external auditory canals, and TMs to otoscopy  NOSE: External nose midline, anterior rhinoscopy is normal with limited visualization to the anterior aspect of the interior turbinates. No lesions noted.  ORAL CAVITY/OROPHARYNX/LIPS: Normal mucous membranes, normal floor of mouth/tongue/OP, no masses or lesions are noted.  PHARYNGEAL WALLS AND NASOPHARYNX: No masses noted. Mucosa appears clean and moist  NECK/LYMPH: No LAD, no thyroid masses. Trachea palpably midline  SKIN: Neck skin is without injury  PSYCH: Alert and oriented with appropriate mood and affect       Nasal endoscopy:  PROCEDURE NOTE    For better visualization because of septal deviation, turbinate hypertrophy nasal endoscopy was performed after verbal consent was obtained by the patient and/or guardian. Both nostrils were sprayed with a mixture of lidocaine 4% and Afrin. After a sufficient amount of time elapsed for mucosal anesthesia to take place, the nasal endoscope was advanced into the nostril.    The following areas were visualized:  Nasal passage, nasal septum, turbinates, middle meatus, nasopharynx, sinus ostia    The patient tolerated the procedure well and these structures were found to be normal except as follows:  Right:    Maxillary antrostomy patent  Ethmoid  clear to roof  Sphenoid patent  Frontal recess patent     Left:   Maxillary antrostomy patent  Ethmoid clear to roof  Sphenoid patent  Frontal recess patent     Notable findings: Widely patent nasal corridors no mucosal edema.  No mucopurulence, polyps, nor masses seen in inferior meati, middle meati, nor sphenoethmoidal recesses bilaterally.       Assessment  Chronic rhinosinusitis s/p ESS 11/02/23  Nasal airway obstruction, nasal septal deviation, Bilateral inferior turbinate hypertrophy status post septoplasty, inferior turbinate reductions.     Plan  The patient is healing well from surgery  Continue saline/mometasone rinses every other day   RTC 1y

## 2025-06-18 ENCOUNTER — APPOINTMENT (OUTPATIENT)
Dept: OTOLARYNGOLOGY | Facility: CLINIC | Age: 40
End: 2025-06-18
Payer: MEDICARE

## 2025-06-18 VITALS — BODY MASS INDEX: 25.79 KG/M2 | WEIGHT: 141 LBS

## 2025-06-18 DIAGNOSIS — J34.89 NASAL OBSTRUCTION: ICD-10-CM

## 2025-06-18 DIAGNOSIS — J32.9 CHRONIC RHINOSINUSITIS: Primary | ICD-10-CM

## 2025-06-18 DIAGNOSIS — R09.81 NASAL CONGESTION: ICD-10-CM

## 2025-06-18 DIAGNOSIS — J34.89 NASAL DRAINAGE: ICD-10-CM

## 2025-06-18 RX ORDER — FLUTICASONE PROPIONATE 50 MCG
1 SPRAY, SUSPENSION (ML) NASAL 2 TIMES DAILY
Qty: 48 G | Refills: 3 | Status: SHIPPED | OUTPATIENT
Start: 2025-06-18 | End: 2026-06-18

## 2025-06-18 NOTE — PROGRESS NOTES
The patient presents for follow up.   Most recent surgery: 11/02/23  1.  Bilateral nasal endoscopy with total ethmoidectomy including       sphenoidotomy with tissue removal  2.  Bilateral nasal endoscopy with frontal sinusotomy  3.  Bilateral maxillary endoscopy with tissue removal   4.  Endoscopic septoplasty  5.  Bilateral submucosal inferior turbinate reductions  6.  Extracranial CT image guidance    Current symptoms: No sinonasal complaints.    Current treatment:  Antibiotics: No  Sinus Rinse: Yes  Steroids: No  Other: None      ROS - No fevers, chills. No cough, hemoptysis. No n/v       Past Medical History:   Diagnosis Date    Allergy to food 05/16/2018    Angio-edema 05/17/2019    Last Assessment & Plan: Formatting of this note might be different from the original. This could be chronic idiopathic urticaria with angioedema vs. idiopathic anaphylaxis vs. both. - Take Zyrtec 10 mg twice daily. - If having a severe reaction, go to the emergency room or [facility] , and have a tryptase level drawn in 1-3 hours after onset of symptoms. - Patient isabelle    Asthma 04/30/2019    Corneal scar, left eye 07/10/2017    Last Assessment & Plan: Formatting of this note might be different from the original. Trace stromal haze centrally and nasally Refracts very well No detectable visual significance Pt does not wear CLs    Fatigue 04/13/2019    Hypothyroidism 04/30/2019    Idiopathic urticaria 05/17/2019    Last Assessment & Plan: Formatting of this note might be different from the original. This could be chronic idiopathic urticaria with angioedema vs. idiopathic anaphylaxis vs. both. - Take Zyrtec 10 mg twice daily. - If having a severe reaction, go to the emergency room or [facility] , and have a tryptase level drawn in 1-3 hours after onset of symptoms. - Patient isabelle    Lip swelling 05/16/2018    Mild intermittent asthma without complication ([facility]) 04/14/2023     Neurogenic syncope 04/13/2019    Neuropathic pain 04/30/2019    Postural orthostatic tachycardia syndrome 04/30/2019    Dx age 19 dys[address]ia Dr. Land/Sammy    Rash 05/16/2018    Raynaud's phenomenon 04/30/2019    Recurrent cystitis 12/13/2022    Refractive error 07/10/2017    Last Assessment & Plan: Formatting of this note might be different from the original. MRx given today    Sinus bradycardia 04/30/2019    Throat tightness 05/17/2019    Last Assessment & Plan: Formatting of this note might be different from the original. There is no evidence of sensitization to eggs and milk. I suspect her shortness of breath and the feeling like her throat is swelling closing may be due to inducible laryngeal obstruction. This can be brought on by different  things, including a history of personal trauma. - Consult placed to see Dr. Dahlia Cardenas    Urinary frequency 12/13/2022       History reviewed. No pertinent surgical history.      Current Outpatient Medications on File Prior to Visit   Medication Sig Dispense Refill    albuterol 90 mcg/actuation inhaler Inhale 2 puffs every 4 hours if needed for wheezing.      ARIPiprazole (Abilify) 2 mg tablet Take 1 tablet (2 mg) by mouth once daily.      azelastine (Astelin) 137 mcg (0.1 %) nasal spray Administer 1 spray into each nostril 2 times a day. Use in each nostril as directed      busPIRone (Buspar) 7.5 mg tablet Take 1 tablet (7.5 mg) by mouth 2 times a day.      calcium carbonate-vitamin D3 500 mg-5 mcg (200 unit) tablet Take 1 tablet by mouth once daily.      cetirizine (ZyrTEC) 1 mg/mL syrup Take by mouth once daily.      cholecalciferol (Vitamin D-3) 50 mcg (2,000 unit) capsule Take 1 capsule (50 mcg) by mouth once daily.      DOCOSAHEXAENOIC ACID ORAL Take 1,000 mg by mouth once daily.      EPINEPHrine (EpiPen) 0.3 mg/0.3 mL injection syringe Inject 0.3 mL (0.3 mg) into the muscle 1 time if needed for anaphylaxis.      esomeprazole (NexIUM) 40 mg DR capsule Take  1 capsule (40 mg) by mouth. Do not open capsule.      FeroSuL tablet Take 1 tablet by mouth early in the morning..      Flovent HFA 44 mcg/actuation inhaler Inhale 1 puff 2 times a day.      fluticasone (Flonase) 50 mcg/actuation nasal spray Administer 2 sprays into each nostril once daily.      hydrOXYzine (Atarax) 10 mg/5 mL syrup Take 10 mL (20 mg) by mouth once daily at bedtime.      inhalational spacing device inhaler Use as instructed      levalbuterol (Xopenex) 45 mcg/actuation inhaler Inhale 1 puff every 4 hours if needed for wheezing.      levonorgestrel (Mirena) 21 mcg/24 hours (8 yrs) 52 mg IUD 52 mg by intrauterine route 1 time.      levothyroxine (Synthroid, Levoxyl) 75 mcg tablet Take 1 tablet (75 mcg) by mouth once daily.      midodrine (Proamatine) 10 mg tablet Take 1 tablet (10 mg) by mouth 3 times a day.      mometasone (Nasonex) 50 mcg/actuation nasal spray Administer 2 sprays into affected nostril(s) once daily.      ondansetron ODT (Zofran-ODT) 4 mg disintegrating tablet Take 1 tablet (4 mg) by mouth every 8 hours if needed for nausea.      oxybutynin XL (Ditropan-XL) 10 mg 24 hr tablet Take 1 tablet (10 mg) by mouth. Do not crush, chew, or split.      propranolol (Inderal) 10 mg tablet Take 1 tablet (10 mg) by mouth 2 times a day.      sod bicarb-sod chlor-neti pot packet with rinse device by sinus irrigation route. Rinse daily   use as directed      sod chloride-sodium bicarb (Neilmed Sinus Rinse Complete) nasal rinse Irrigate your nose per instructions twice daily 1 each 3    turmeric root extract 500 mg capsule Take 1 tablet by mouth.      vortioxetine (Trintellix) 5 mg tablet tablet Take 1 tablet (5 mg) by mouth once daily. For depression      Vraylar 1.5 mg capsule Take 1 capsule (1.5 mg) by mouth once every 24 hours.       No current facility-administered medications on file prior to visit.        Review of Systems  A detailed 12 point ROS was performed and is negative except as noted in  the intake form, HPI and/or Past Medical History     There were no vitals filed for this visit.       Physical Exam   CONSTITUTIONAL: Well developed, well nourished.  VOICE: Normal voice quality  RESPIRATION: Breathing comfortably, no stridor.  CV: No clubbing/cyanosis/edema in hands.  EYES: EOM Intact, sclera normal.  NEURO: Alert and oriented times 3, Cranial nerves V,VII intact and symmetric bilaterally.  HEAD AND FACE: Symmetric facial features, no masses or lesions, sinuses nontender to palpation.  SALIVARY GLANDS: Parotid and submandibular glands normal bilaterally.  EARS: Normal external ears, external auditory canals, and TMs to otoscopy  NOSE: External nose midline, anterior rhinoscopy is normal with limited visualization to the anterior aspect of the interior turbinates. No lesions noted.  ORAL CAVITY/OROPHARYNX/LIPS: Normal mucous membranes, normal floor of mouth/tongue/OP, no masses or lesions are noted.  PHARYNGEAL WALLS AND NASOPHARYNX: No masses noted. Mucosa appears clean and moist  NECK/LYMPH: No LAD, no thyroid masses. Trachea palpably midline  SKIN: Neck skin is without injury  PSYCH: Alert and oriented with appropriate mood and affect       Nasal endoscopy:  PROCEDURE NOTE    For better visualization because of septal deviation, turbinate hypertrophy nasal endoscopy was performed after verbal consent was obtained by the patient and/or guardian. Both nostrils were sprayed with a mixture of lidocaine 4% and Afrin. After a sufficient amount of time elapsed for mucosal anesthesia to take place, the nasal endoscope was advanced into the nostril.    The following areas were visualized:  Nasal passage, nasal septum, turbinates, middle meatus, nasopharynx, sinus ostia    The patient tolerated the procedure well and these structures were found to be normal except as follows:  Right:    Maxillary antrostomy patent  Ethmoid clear to roof  Sphenoid patent  Frontal recess patent     Left:   Maxillary  antrostomy patent  Ethmoid clear to roof  Sphenoid patent  Frontal recess patent     Notable findings: Widely patent nasal corridors. No mucosal edema.  No mucopurulence, polyps, nor masses seen in inferior meati, middle meati, nor sphenoethmoidal recesses bilaterally.       Assessment  Chronic rhinosinusitis s/p ESS 11/02/23  Nasal airway obstruction, nasal septal deviation, Bilateral inferior turbinate hypertrophy status post septoplasty, inferior turbinate reductions.     Plan  Sinonasal symptomatology is well-controlled  Continue saline irrigations  She will use Flonase during fall for seasonal allergies  RTC 1y

## 2026-06-22 ENCOUNTER — APPOINTMENT (OUTPATIENT)
Dept: OTOLARYNGOLOGY | Facility: CLINIC | Age: 41
End: 2026-06-22
Payer: MEDICARE